# Patient Record
Sex: FEMALE | Race: BLACK OR AFRICAN AMERICAN | HISPANIC OR LATINO
[De-identification: names, ages, dates, MRNs, and addresses within clinical notes are randomized per-mention and may not be internally consistent; named-entity substitution may affect disease eponyms.]

---

## 2017-12-16 RX ORDER — SODIUM CHLORIDE 9 MG/ML
3 INJECTION INTRAMUSCULAR; INTRAVENOUS; SUBCUTANEOUS EVERY 8 HOURS
Qty: 0 | Refills: 0 | Status: DISCONTINUED | OUTPATIENT
Start: 2017-12-18 | End: 2017-12-18

## 2017-12-16 RX ORDER — FAMOTIDINE 10 MG/ML
20 INJECTION INTRAVENOUS ONCE
Qty: 0 | Refills: 0 | Status: COMPLETED | OUTPATIENT
Start: 2017-12-18 | End: 2017-12-18

## 2017-12-18 ENCOUNTER — RESULT REVIEW (OUTPATIENT)
Age: 52
End: 2017-12-18

## 2017-12-18 ENCOUNTER — OUTPATIENT (OUTPATIENT)
Dept: OUTPATIENT SERVICES | Facility: HOSPITAL | Age: 52
LOS: 1 days | Discharge: HOME | End: 2017-12-18
Payer: COMMERCIAL

## 2017-12-18 VITALS
TEMPERATURE: 98 F | RESPIRATION RATE: 14 BRPM | WEIGHT: 141.98 LBS | SYSTOLIC BLOOD PRESSURE: 140 MMHG | HEIGHT: 64 IN | OXYGEN SATURATION: 100 % | DIASTOLIC BLOOD PRESSURE: 75 MMHG | HEART RATE: 73 BPM

## 2017-12-18 VITALS
DIASTOLIC BLOOD PRESSURE: 70 MMHG | HEART RATE: 69 BPM | OXYGEN SATURATION: 100 % | TEMPERATURE: 98 F | SYSTOLIC BLOOD PRESSURE: 131 MMHG | RESPIRATION RATE: 14 BRPM

## 2017-12-18 DIAGNOSIS — Z90.710 ACQUIRED ABSENCE OF BOTH CERVIX AND UTERUS: Chronic | ICD-10-CM

## 2017-12-18 PROCEDURE — 88304 TISSUE EXAM BY PATHOLOGIST: CPT | Mod: 26

## 2017-12-18 RX ORDER — OXYCODONE AND ACETAMINOPHEN 5; 325 MG/1; MG/1
1 TABLET ORAL EVERY 4 HOURS
Qty: 0 | Refills: 0 | Status: DISCONTINUED | OUTPATIENT
Start: 2017-12-18 | End: 2017-12-18

## 2017-12-18 RX ORDER — FENTANYL CITRATE 50 UG/ML
25 INJECTION INTRAVENOUS
Qty: 0 | Refills: 0 | Status: DISCONTINUED | OUTPATIENT
Start: 2017-12-18 | End: 2017-12-18

## 2017-12-18 RX ORDER — SODIUM CHLORIDE 9 MG/ML
1000 INJECTION INTRAMUSCULAR; INTRAVENOUS; SUBCUTANEOUS
Qty: 0 | Refills: 0 | Status: DISCONTINUED | OUTPATIENT
Start: 2017-12-18 | End: 2017-12-18

## 2017-12-18 RX ORDER — ERGOCALCIFEROL 1.25 MG/1
2000 CAPSULE ORAL
Qty: 0 | Refills: 0 | COMMUNITY

## 2017-12-18 RX ORDER — ONDANSETRON 8 MG/1
4 TABLET, FILM COATED ORAL ONCE
Qty: 0 | Refills: 0 | Status: DISCONTINUED | OUTPATIENT
Start: 2017-12-18 | End: 2017-12-18

## 2017-12-18 RX ORDER — MEPERIDINE HYDROCHLORIDE 50 MG/ML
12.5 INJECTION INTRAMUSCULAR; INTRAVENOUS; SUBCUTANEOUS
Qty: 0 | Refills: 0 | Status: DISCONTINUED | OUTPATIENT
Start: 2017-12-18 | End: 2017-12-18

## 2017-12-18 RX ORDER — ACETAMINOPHEN 500 MG
1000 TABLET ORAL ONCE
Qty: 0 | Refills: 0 | Status: DISCONTINUED | OUTPATIENT
Start: 2017-12-18 | End: 2017-12-18

## 2017-12-18 RX ADMIN — FAMOTIDINE 20 MILLIGRAM(S): 10 INJECTION INTRAVENOUS at 07:22

## 2017-12-18 NOTE — ASU DISCHARGE PLAN (ADULT/PEDIATRIC). - MEDICATION SUMMARY - MEDICATIONS TO CHANGE
I will SWITCH the dose or number of times a day I take the medications listed below when I get home from the hospital:    Vitamin D2  -- 2000 unit(s) by mouth once a day

## 2017-12-28 DIAGNOSIS — D17.1 BENIGN LIPOMATOUS NEOPLASM OF SKIN AND SUBCUTANEOUS TISSUE OF TRUNK: ICD-10-CM

## 2018-06-04 ENCOUNTER — APPOINTMENT (OUTPATIENT)
Dept: OBGYN | Facility: CLINIC | Age: 53
End: 2018-06-04
Payer: COMMERCIAL

## 2018-06-04 ENCOUNTER — TRANSCRIPTION ENCOUNTER (OUTPATIENT)
Age: 53
End: 2018-06-04

## 2018-06-04 VITALS
SYSTOLIC BLOOD PRESSURE: 120 MMHG | HEIGHT: 64 IN | WEIGHT: 147 LBS | HEART RATE: 80 BPM | DIASTOLIC BLOOD PRESSURE: 67 MMHG | BODY MASS INDEX: 25.1 KG/M2

## 2018-06-04 DIAGNOSIS — Z78.9 OTHER SPECIFIED HEALTH STATUS: ICD-10-CM

## 2018-06-04 DIAGNOSIS — Z00.00 ENCOUNTER FOR GENERAL ADULT MEDICAL EXAMINATION W/OUT ABNORMAL FINDINGS: ICD-10-CM

## 2018-06-04 PROCEDURE — 99396 PREV VISIT EST AGE 40-64: CPT

## 2018-06-06 LAB
C TRACH RRNA SPEC QL NAA+PROBE: NOT DETECTED
HPV HIGH+LOW RISK DNA PNL CVX: NOT DETECTED
N GONORRHOEA RRNA SPEC QL NAA+PROBE: NOT DETECTED
SOURCE TP AMPLIFICATION: NORMAL

## 2018-06-11 LAB — CYTOLOGY CVX/VAG DOC THIN PREP: NORMAL

## 2020-06-09 ENCOUNTER — APPOINTMENT (OUTPATIENT)
Dept: OBGYN | Facility: CLINIC | Age: 55
End: 2020-06-09
Payer: COMMERCIAL

## 2020-06-09 VITALS
HEIGHT: 64 IN | SYSTOLIC BLOOD PRESSURE: 124 MMHG | HEART RATE: 89 BPM | DIASTOLIC BLOOD PRESSURE: 81 MMHG | WEIGHT: 149.44 LBS | BODY MASS INDEX: 25.51 KG/M2

## 2020-06-09 PROCEDURE — 99214 OFFICE O/P EST MOD 30 MIN: CPT

## 2020-06-09 NOTE — PHYSICAL EXAM
[Awake] : awake [Alert] : alert [Acute Distress] : no acute distress [Mass] : no breast mass [Nipple Discharge] : no nipple discharge [Axillary LAD] : no axillary lymphadenopathy [Soft] : soft [Tender] : non tender [Oriented x3] : oriented to person, place, and time [Normal] : cervix [Cystocele] : no cystocele [Atrophy] : atrophy [Dry Mucosa] : dry mucosa [Discharge] : a  ~M vaginal discharge was present [No Bleeding] : there was no active vaginal bleeding [Absent] : absent [Uterine Adnexae] : were not tender and not enlarged

## 2020-06-12 LAB — CYTOLOGY CVX/VAG DOC THIN PREP: NORMAL

## 2021-07-18 ENCOUNTER — TRANSCRIPTION ENCOUNTER (OUTPATIENT)
Age: 56
End: 2021-07-18

## 2022-01-12 ENCOUNTER — TRANSCRIPTION ENCOUNTER (OUTPATIENT)
Age: 57
End: 2022-01-12

## 2022-03-16 ENCOUNTER — APPOINTMENT (OUTPATIENT)
Dept: OBGYN | Facility: CLINIC | Age: 57
End: 2022-03-16
Payer: COMMERCIAL

## 2022-03-16 VITALS
DIASTOLIC BLOOD PRESSURE: 60 MMHG | HEIGHT: 64 IN | SYSTOLIC BLOOD PRESSURE: 110 MMHG | WEIGHT: 146 LBS | BODY MASS INDEX: 24.92 KG/M2

## 2022-03-16 DIAGNOSIS — N90.5 ATROPHY OF VULVA: ICD-10-CM

## 2022-03-16 DIAGNOSIS — N95.2 POSTMENOPAUSAL ATROPHIC VAGINITIS: ICD-10-CM

## 2022-03-16 DIAGNOSIS — Z78.0 ASYMPTOMATIC MENOPAUSAL STATE: ICD-10-CM

## 2022-03-16 PROCEDURE — 99396 PREV VISIT EST AGE 40-64: CPT

## 2022-03-16 NOTE — COUNSELING
[Body Image] : body image [Nutrition/ Exercise/ Weight Management] : nutrition, exercise, weight management [Vitamins/Supplements] : vitamins/supplements [Sunscreen] : sunscreen [Smoking Cessation] : smoking cessation [Drugs/Alcohol] : drugs, alcohol [Breast Self Exam] : breast self exam [Bladder Hygiene] : bladder hygiene

## 2022-03-16 NOTE — PHYSICAL EXAM
[Chaperone Present] : A chaperone was present in the examining room during all aspects of the physical examination [FreeTextEntry1] : meena [Appropriately responsive] : appropriately responsive [Alert] : alert [No Acute Distress] : no acute distress [No Lymphadenopathy] : no lymphadenopathy [Regular Rate Rhythm] : regular rate rhythm [No Murmurs] : no murmurs [Clear to Auscultation B/L] : clear to auscultation bilaterally [Soft] : soft [Non-tender] : non-tender [Non-distended] : non-distended [No HSM] : No HSM [No Lesions] : no lesions [No Mass] : no mass [Oriented x3] : oriented x3 [Examination Of The Breasts] : a normal appearance [No Masses] : no breast masses were palpable [Vulvar Atrophy] : vulvar atrophy [Labia Majora] : normal [Labia Minora] : normal [Atrophy] : atrophy [Normal] : normal [Absent] : absent [Uterine Adnexae] : normal

## 2022-03-16 NOTE — HISTORY OF PRESENT ILLNESS
[Y] : Positive pregnancy history [Menarche Age: ____] : age at menarche was [unfilled] [PGHxTotal] : 5 [[facility]] : 5 [PGHxPremature] : 0 [PGHxAbortions] : 0 [[facility]Living] : 4 [PGHxABInduced] : 0 [PGHxABSpont] : 0 [PGHxEctopic] : 0 [PGHxMultBirths] : 0 [FreeTextEntry1] : 1 stillbirth

## 2022-03-23 LAB — CYTOLOGY CVX/VAG DOC THIN PREP: NORMAL

## 2022-05-29 ENCOUNTER — EMERGENCY (EMERGENCY)
Facility: HOSPITAL | Age: 57
LOS: 0 days | Discharge: HOME | End: 2022-05-29
Attending: EMERGENCY MEDICINE
Payer: COMMERCIAL

## 2022-05-29 VITALS
HEART RATE: 73 BPM | DIASTOLIC BLOOD PRESSURE: 73 MMHG | RESPIRATION RATE: 18 BRPM | TEMPERATURE: 98 F | SYSTOLIC BLOOD PRESSURE: 127 MMHG | OXYGEN SATURATION: 97 %

## 2022-05-29 VITALS — HEIGHT: 64 IN | WEIGHT: 145.95 LBS

## 2022-05-29 DIAGNOSIS — M79.644 PAIN IN RIGHT FINGER(S): ICD-10-CM

## 2022-05-29 DIAGNOSIS — Z90.711 ACQUIRED ABSENCE OF UTERUS WITH REMAINING CERVICAL STUMP: ICD-10-CM

## 2022-05-29 DIAGNOSIS — L03.011 CELLULITIS OF RIGHT FINGER: ICD-10-CM

## 2022-05-29 DIAGNOSIS — Z90.710 ACQUIRED ABSENCE OF BOTH CERVIX AND UTERUS: Chronic | ICD-10-CM

## 2022-05-29 PROCEDURE — 99284 EMERGENCY DEPT VISIT MOD MDM: CPT | Mod: 25

## 2022-05-29 PROCEDURE — 10060 I&D ABSCESS SIMPLE/SINGLE: CPT

## 2022-05-29 PROCEDURE — 99282 EMERGENCY DEPT VISIT SF MDM: CPT

## 2022-05-29 NOTE — ED STATDOCS - OBJECTIVE STATEMENT
56 y/o female with a PMHx of abd hysterectomy presents to the ED c/o finger pain. States she had thumb pain 05/25 then developed swelling and went to MD 05/ 27 and was rx Amoxicillin. Reports she went to Urgent Care and was sent to ED.  No fever or chills. Notes her thumb looks worse than how it did when she started to have pain.

## 2022-05-29 NOTE — ED ADULT NURSE NOTE - OBJECTIVE STATEMENT
right thumb swelling and painful and warm to touch. on ABX since friday. Sent to ED for further treatment.

## 2022-05-29 NOTE — ED STATDOCS - MUSCULOSKELETAL, MLM
range of motion is not limited and there is no muscle tenderness. Swelling mostly to distal surface of R thumb, slight swelling at base of nail

## 2022-05-29 NOTE — ED STATDOCS - PATIENT PORTAL LINK FT
You can access the FollowMyHealth Patient Portal offered by [facility] by registering at the following website: [email or web address]. By joining [facility]’s FollowMyHealth portal, you will also be able to view your health information using other applications (apps) compatible with our system.

## 2022-05-29 NOTE — ED STATDOCS - PROGRESS NOTE DETAILS
signed Claritza Mcqueen PA-C Pt seen initially in intake by Dr Ochoa.   57F with right thumb infection, pain and swelling x 6 days. Pt denies nail biting or recent manicure. Pt saw PMD on 5/27 and was given augmentin but symptoms continued to worsen. No fever. Pt with felon or possible paronychia of thumb. Seen in ED by ortho resident, I&D performed. Pt may DC home, f/u on 6/1 with Dr Hernández. COntinue abx. Pt feeling well, pt and family agree with DC and plan of care.

## 2022-05-29 NOTE — ED STATDOCS - NSFOLLOWUPINSTRUCTIONS_ED_ALL_ED_FT
FOLLOW UP WITH DR ROCHA ON TUESDAY IN HIS OFFICE. CALL THE OFFICE TO MAKE AN APPOINTMENT. CONTINUE TAKING YOUR ANTIBIOTICS. RETURN TO ER FOR ANY WORSENING SYMPTOMS OR NEW CONCERNS.     Fingertip Infection    A normal fingertip next to a fingertip that is infected and has pus around the nail.   There are two main types of fingertip infections:  •Long-term (chronic) or acute paronychia. This is an infection that happens around your nail. This type of infection can start in one nail or occur gradually over time and affect more than one nail. The fingernails that are infected may become thick and deformed. This condition can also happen suddenly (be acute).      •Felon. This is a bacterial infection in the tip of your finger (pad). A felon infection can cause a painful collection of pus (an abscess) to form inside your fingertip. If the infection is not treated, the infection can spread as deep as the tendon or bone.        What are the causes?    Paronychia infection can be caused by:  •Bacteria.      •Funguses.      •A mix of both bacteria and funguses.      A felon infection is usually caused by the bacteria that are normally found on your skin. An infection can develop if the bacteria spread through your skin to the pad of tissue inside your fingertip.      What increases the risk?    You are more likely to develop a fingertip infection if:  •You have diabetes.      •You have a weak body's defense system (immune system).      •You work with your hands.      •Your hands are exposed to moisture, chemicals, or irritants for long periods of time.      •You have poor circulation.      •You bite, chew, or pick your fingernails.        What are the signs or symptoms?    Symptoms of paronychia infection may affect one or more fingernails and may include:  •Pain, swelling, and redness around the nail.      •Pus-filled pockets at the base or side of the fingernail (cuticle).      •Thick fingernails that separate from the nail bed.      •Pus that drains from the nail bed.      Symptoms of a felon usually affect just one fingertip pad and include:  •Severe, throbbing pain.      •Redness.      •Swelling.      •Warmth.      •Tenderness when the affected fingertip is touched.        How is this diagnosed?    This condition is diagnosed based on:  •Your medical history.      •A physical exam.      •Testing. If there is pus draining from the infection, it may be swabbed and sent to the lab for a culture.      •An X-ray. This may be done to see if the infection has spread to the bone.        How is this treated?    Treatment for a fingertip infection may include:  •Warm water or salt water soaks several times per day.      •Antibiotic medicine. This may be an ointment or pills.      •Steroid ointment.      •Antifungal pills.      •Drainage of pus pockets. This is done by making an incision to open the fingertip to drain pus.      •Wearing gloves to protect your nails.        Follow these instructions at home:    Medicines     •Take or apply over-the-counter and prescription medicines only as told by your health care provider.      •If you were prescribed an antibiotic medicine, take or apply it as told by your health care provider. Do not stop using the antibiotic even if you start to feel better.      Wound care   •Follow instructions from your health care provider about how to take care of your wound. Make sure you:  •Wash your hands with soap and water before and after you change your bandage (dressing). If soap and water are not available, use hand .      •Change your dressing as told by your health care provider.      •Leave stitches (sutures), skin glue, or adhesive strips in place. These skin closures may need to stay in place for 2 weeks or longer. If adhesive strip edges start to loosen and curl up, you may trim the loose edges. Do not remove adhesive strips completely unless your health care provider tells you to do that.      •Clean the infected area each day with warm water or salt water, or as told by your health care provider.  •Gently wash the infected area with mild soap and water.      •Rinse the infected area with water to remove all soap.      •Pat the infected area dry with a clean towel. Do not rub it.      •To make a salt and water mixture, completely dissolve ½–1 tsp (3–6 g) of salt in 1 cup (237 mL) of warm water.      •Check the infected area every day for more signs of infection. Watch for:  •More redness, swelling, or pain.      •More fluid or blood.      •Warmth.      •A bad smell.        Bathing     •Keep the dressing dry until your health care provider says it can be removed.      •Ask your health care provider if you may take baths, swim, shower, or use a hot tub. To help prevent spread of the infection, you may only be allowed to take sponge baths. This is rare.      • Do not let your bandage get wet. Cover it with a watertight covering when you take a bath or shower.      General instructions     •Raise (elevate) the infected area above the level of your heart while you are sitting or lying down or as told by your health care provider. This will help reduce inflammation.      • Do not scratch or pick at the infected area.      •Wear gloves as told by your health care provider.      •Keep all follow-up visits as told by your health care provider. This is important.        How is this prevented?    •Wear gloves when you work with your hands.      •Wash your hands often with antibacterial soap.      •Avoid letting your hands stay wet or irritated for long periods of time.      • Do not bite your fingernails.      • Do not suck on your fingers.      • Do not pull on your cuticles.      •Use clean scissors or nail clippers to trim your nails. Do not cut your fingernails very short.        Contact a health care provider if:    •Your pain medicine is not helping.      •You have more redness, swelling, or pain at your fingertip.      •You continue to have fluid, blood, or pus coming from your fingertip.      •Your infection area feels warm to the touch.      •You continue to notice a bad smell coming from your fingertip or your dressing.        Get help right away if:    •The area of redness is spreading, or you notice a red streak going away from your fingertip.      •You have a fever.        Summary    •Paronychia is an infection that happens around your nail. Paronychia infection can be caused by bacteria, funguses, or a mix of both.      •A felon infection is usually caused by the bacteria that are normally found on your skin. An infection can develop if the bacteria spread through your skin to the pad of tissue inside your fingertip.      •Follow instructions from your health care provider about how to take care of the infection.      •Take or apply over-the-counter and prescription medicines only as told by your health care provider.      •Contact a health care provider if you have more drainage, redness, swelling, or pain at your fingertip.      This information is not intended to replace advice given to you by your health care provider. Make sure you discuss any questions you have with your health care provider.      Document Revised: 03/29/2022 Document Reviewed: 03/29/2022    Elsevier Patient Education © 2022 [employer]

## 2022-05-29 NOTE — CONSULT NOTE ADULT - SUBJECTIVE AND OBJECTIVE BOX
57y Female presents with right thumb swelling distal to the DIP joint, of the fingertip pulp. Patient reports that thumb pain stated 5/25, she went to her PCP on 5/27 where she was given Augmentin. After failure to improve on the oral abx, patient went to urgent care and was referred to [facility] ED. Denies numbness/tingling in the affected digit. Denies trauma, bite, picking/biting, recent trip to the nail salon or other orthopedic injuries at this time. Patient is RIGHT hand dominant. Patient ambulates without assistance at baseline. Patient denies fever / chills. No fusiform swelling, no flexed posturing, no tenderness over the flexor tendon sheath, no pain w/ passive extension of the digit. Patient has no tenderness in the palm or the base of the affected phalanx.    PAST MEDICAL & SURGICAL HISTORY:  No pertinent past medical history      H/O abdominal hysterectomy        Home Medications:    Allergies    No Known Allergies    Intolerances                        Vital Signs Last 24 Hrs  T(C): 36.8 (29 May 2022 15:44), Max: 36.8 (29 May 2022 15:44)  T(F): 98.2 (29 May 2022 15:44), Max: 98.2 (29 May 2022 15:44)  HR: 73 (29 May 2022 15:44) (73 - 73)  BP: 127/73 (29 May 2022 15:44) (127/73 - 127/73)  BP(mean): 90 (29 May 2022 15:44) (90 - 90)  RR: 18 (29 May 2022 15:44) (18 - 18)  SpO2: 97% (29 May 2022 15:44) (97% - 97%)    PHYSICAL EXAM  General: NAD, Awake and Alert    RUE:   Skin intact, no open wound  Mild erythema of the fingertip pulp  +Swelling of the Pulp distal to the DIP  No gross deformity  NTTP over the bony prominences of the elbow/forearm/wrist  No tenderness over the palmar space  No streaking, no fusiform swelling, no pain with passive extension, no flexed posturing, no tenderness to palpation over the tendon sheath   painless passive/active ROM of the shoulder/elbow/wrist/hand  Radial and ulnar sensory nerves to thumb intact  C5-T1 SILT  motor grossly intact throughout axillary/musculocutaenous/radial/median/ulnar nerves  + radial pulse  warm/well perfused    Procedure:  Under aseptic conditions, a digital block was administered to the right thumb w/ 7cc of 1% lidocaine. The hand was prepped and draped in standard sterile fashion. A radial sided incision with a #11 blade was made in a mid-radial aspect of the lateral fingertip pulp. A mosquito was used to spread breaking septations and expressing a minimal amount of turbid/purulent fluid. No gross pus was expressible. Wound copiously irrigated with betadine, followed by normal saline. Xeroform was laid over the finger in a stirrup shape to cover the incision. A dry dressing of 4x4 gauze was applied, loosely wrapped with cling and coban. The patient tolerated the procedure well and there we no complications. The patient's post-procedure neurovascular exam was unchanged.       Assessment/Plan:  57y Female with R thumb felon s/p I&D in the ED, unlikely paronychia    -No concerning symptoms for flexor tenosynovitis or deep space infection of the hand  -I&D bedside in the ED performed w/ local anesthesia  -Antibiotics per the ED, patient previously taking Augmentin per  at bedside   -Pain control as needed per ED  -DVT ppx per primary  -NWB right hand in well padded dressing, loosely wrapped with cling/coban  -Please keep dressing c/d/i until follow up appointment Tuesday  -Patient instructed to return to the ED if clinical condition worsens for further evaluation and management   -Follow up w/ Dr. Hernández Tuesday, 5/31, please call the office to make an appointment   -Discussed with attending Dr. Hernández, who is aware  -Will advise if plan changes

## 2022-05-29 NOTE — ED STATDOCS - CARE PROVIDER_API CALL
Liban Hernández)  Orthopaedic Surgery; Surgery of the Hand  [address] [place] [address]  Phone: [phone]  Fax: [phone]  Follow Up Time:

## 2022-05-29 NOTE — ED STATDOCS - NS_ATTENDINGSCRIBE_ED_ALL_ED
Tessy Corbin)  Internal Medicine; Pulmonary Disease  [address] [facility] [facility] [address]  [place] [address]  Phone: [phone]  Fax: [phone]  Follow Up Time:
I personally performed the service described in the documentation recorded by the scribe in my presence, and it accurately and completely records my words and actions.

## 2022-05-29 NOTE — ED STATDOCS - ATTENDING APP SHARED VISIT CONTRIBUTION OF CARE
I,Amor Ochoa MD,  performed the initial face to face bedside interview with this patient regarding history of present illness, review of symptoms and relevant past medical, social and family history.  I completed an independent physical examination.  I was the initial provider who evaluated this patient. I have signed out the follow up of any pending tests (i.e. labs, radiological studies) to the ACP.  I have communicated the patient’s plan of care and disposition with the ACP.  The history, relevant review of systems, past medical and surgical history, medical decision making, and physical examination was documented by the scribe in my presence and I attest to the accuracy of the documentation.

## 2022-05-29 NOTE — ED STATDOCS - NS ED ATTENDING STATEMENT MOD
This was a shared visit with the CARLI. I reviewed and verified the documentation and independently performed the documented:

## 2023-10-10 ENCOUNTER — TRANSCRIPTION ENCOUNTER (OUTPATIENT)
Age: 58
End: 2023-10-10

## 2023-10-11 ENCOUNTER — OUTPATIENT (OUTPATIENT)
Dept: OUTPATIENT SERVICES | Facility: HOSPITAL | Age: 58
LOS: 1 days | End: 2023-10-11
Payer: COMMERCIAL

## 2023-10-11 ENCOUNTER — TRANSCRIPTION ENCOUNTER (OUTPATIENT)
Age: 58
End: 2023-10-11

## 2023-10-11 VITALS
RESPIRATION RATE: 17 BRPM | HEIGHT: 63.5 IN | SYSTOLIC BLOOD PRESSURE: 151 MMHG | OXYGEN SATURATION: 100 % | HEART RATE: 67 BPM | DIASTOLIC BLOOD PRESSURE: 82 MMHG | WEIGHT: 131.4 LBS | TEMPERATURE: 98 F

## 2023-10-11 VITALS
HEART RATE: 64 BPM | RESPIRATION RATE: 17 BRPM | DIASTOLIC BLOOD PRESSURE: 63 MMHG | SYSTOLIC BLOOD PRESSURE: 108 MMHG | OXYGEN SATURATION: 100 %

## 2023-10-11 DIAGNOSIS — Z90.89 ACQUIRED ABSENCE OF OTHER ORGANS: Chronic | ICD-10-CM

## 2023-10-11 DIAGNOSIS — H35.341 MACULAR CYST, HOLE, OR PSEUDOHOLE, RIGHT EYE: ICD-10-CM

## 2023-10-11 DIAGNOSIS — Z90.710 ACQUIRED ABSENCE OF BOTH CERVIX AND UTERUS: Chronic | ICD-10-CM

## 2023-10-11 PROCEDURE — 67041 VIT FOR MACULAR PUCKER: CPT | Mod: AS,RT

## 2023-10-11 PROCEDURE — 67042 VIT FOR MACULAR HOLE: CPT | Mod: RT

## 2023-10-11 PROCEDURE — C1889: CPT

## 2023-10-11 DEVICE — LASER PROBE 25G CONSTELLATION
Type: IMPLANTABLE DEVICE | Site: RIGHT | Status: NON-FUNCTIONAL
Removed: 2023-10-11

## 2023-10-11 DEVICE — GS SF6 SULFER HEXAFLUORIDE 3 L 20GM
Type: IMPLANTABLE DEVICE | Site: RIGHT | Status: NON-FUNCTIONAL
Removed: 2023-10-11

## 2023-10-11 NOTE — ASU DISCHARGE PLAN (ADULT/PEDIATRIC) - CARE PROVIDER_API CALL
Bradly Serrano  [address]  [place] [address]  Phone: [phone]  Fax: (   )    -  Scheduled Appointment: 10/12/2023 09:45 AM   Bradly Serrano  [address]  [place] [address]  Phone: [phone]  Fax: (   )    -  Scheduled Appointment: 10/12/2023 09:45 AM   Bradly Serrano  [address]  [place] [address]  Phone: [phone]  Fax: (   )    -  Scheduled Appointment: 10/12/2023 09:45 AM

## 2023-10-11 NOTE — ASU PATIENT PROFILE, ADULT - PAIN LOCATION, PROFILE
neck Peng Advancement Flap Text: The defect edges were debeveled with a #15 scalpel blade.  Given the location of the defect, shape of the defect and the proximity to free margins a Peng advancement flap was deemed most appropriate.  Using a sterile surgical marker, an appropriate advancement flap was drawn incorporating the defect and placing the expected incisions within the relaxed skin tension lines where possible. The area thus outlined was incised deep to adipose tissue with a #15 scalpel blade.  The skin margins were undermined to an appropriate distance in all directions utilizing iris scissors.

## 2023-10-11 NOTE — ASU DISCHARGE PLAN (ADULT/PEDIATRIC) - ASU DC SPECIAL INSTRUCTIONSFT
Maintain face down position  Follow up in the office tomorrow  Hold Aleve for tonight,   May discuss further with surgeon when seen in the office tomorrow

## 2023-10-11 NOTE — ASU DISCHARGE PLAN (ADULT/PEDIATRIC) - WEAR THE DARK SUNGLASSES WE HAVE PROVIDED WHILE OUTDOORS OR IF YOU ARE LIGHT-SENSITIVE
Problem: MOBILITY - ADULT  Goal: Maintain or return to baseline ADL function  Description: INTERVENTIONS:  -  Assess patient's ability to carry out ADLs; assess patient's baseline for ADL function and identify physical deficits which impact ability to perform ADLs (bathing, care of mouth/teeth, toileting, grooming, dressing, etc )  - Assess/evaluate cause of self-care deficits   - Assess range of motion  - Assess patient's mobility; develop plan if impaired  - Assess patient's need for assistive devices and provide as appropriate  - Encourage maximum independence but intervene and supervise when necessary  - Involve family in performance of ADLs  - Assess for home care needs following discharge   - Consider OT consult to assist with ADL evaluation and planning for discharge  - Provide patient education as appropriate  Outcome: Progressing  Goal: Maintains/Returns to pre admission functional level  Description: INTERVENTIONS:  - Perform BMAT or MOVE assessment daily    - Set and communicate daily mobility goal to care team and patient/family/caregiver  - Collaborate with rehabilitation services on mobility goals if consulted  - Perform Range of Motion 4 times a day  - Reposition patient every 2 hours    - Dangle patient 2 times a day  - Stand patient 2 times a day  - Ambulate patient 2 times a day  - Out of bed to chair 2 times a day   - Out of bed for meals 2 times a day  - Out of bed for toileting  - Record patient progress and toleration of activity level   Outcome: Progressing     Problem: Prexisting or High Potential for Compromised Skin Integrity  Goal: Skin integrity is maintained or improved  Description: INTERVENTIONS:  - Identify patients at risk for skin breakdown  - Assess and monitor skin integrity  - Assess and monitor nutrition and hydration status  - Monitor labs   - Assess for incontinence   - Turn and reposition patient  - Assist with mobility/ambulation  - Relieve pressure over bony prominences  - Avoid friction and shearing  - Provide appropriate hygiene as needed including keeping skin clean and dry  - Evaluate need for skin moisturizer/barrier cream  - Collaborate with interdisciplinary team   - Patient/family teaching  - Consider wound care consult   Outcome: Progressing     Problem: Potential for Falls  Goal: Patient will remain free of falls  Description: INTERVENTIONS:  - Educate patient/family on patient safety including physical limitations  - Instruct patient to call for assistance with activity   - Consult OT/PT to assist with strengthening/mobility   - Keep Call bell within reach  - Keep bed low and locked with side rails adjusted as appropriate  - Keep care items and personal belongings within reach  - Initiate and maintain comfort rounds  - Make Fall Risk Sign visible to staff    - Apply yellow socks and bracelet for high fall risk patients  - Consider moving patient to room near nurses station  Outcome: Progressing     Problem: PAIN - ADULT  Goal: Verbalizes/displays adequate comfort level or baseline comfort level  Description: Interventions:  - Encourage patient to monitor pain and request assistance  - Assess pain using appropriate pain scale  - Administer analgesics based on type and severity of pain and evaluate response  - Implement non-pharmacological measures as appropriate and evaluate response  - Consider cultural and social influences on pain and pain management  - Notify physician/advanced practitioner if interventions unsuccessful or patient reports new pain  Outcome: Progressing     Problem: INFECTION - ADULT  Goal: Absence or prevention of progression during hospitalization  Description: INTERVENTIONS:  - Assess and monitor for signs and symptoms of infection  - Monitor lab/diagnostic results  - Monitor all insertion sites, i e  indwelling lines, tubes, and drains  - Monitor endotracheal if appropriate and nasal secretions for changes in amount and color  - Kirkman appropriate cooling/warming therapies per order  - Administer medications as ordered  - Instruct and encourage patient and family to use good hand hygiene technique  - Identify and instruct in appropriate isolation precautions for identified infection/condition  Outcome: Progressing  Goal: Absence of fever/infection during neutropenic period  Description: INTERVENTIONS:  - Monitor WBC    Outcome: Progressing     Problem: SAFETY ADULT  Goal: Maintain or return to baseline ADL function  Description: INTERVENTIONS:  -  Assess patient's ability to carry out ADLs; assess patient's baseline for ADL function and identify physical deficits which impact ability to perform ADLs (bathing, care of mouth/teeth, toileting, grooming, dressing, etc )  - Assess/evaluate cause of self-care deficits   - Assess range of motion  - Assess patient's mobility; develop plan if impaired  - Assess patient's need for assistive devices and provide as appropriate  - Encourage maximum independence but intervene and supervise when necessary  - Involve family in performance of ADLs  - Assess for home care needs following discharge   - Consider OT consult to assist with ADL evaluation and planning for discharge  - Provide patient education as appropriate  Outcome: Progressing  Goal: Maintains/Returns to pre admission functional level  Description: INTERVENTIONS:  - Perform BMAT or MOVE assessment daily    - Set and communicate daily mobility goal to care team and patient/family/caregiver  - Collaborate with rehabilitation services on mobility goals if consulted  - Perform Range of Motion 4 times a day  - Reposition patient every 2 hours    - Dangle patient 2 times a day  - Stand patient 2 times a day  - Ambulate patient 2 times a day  - Out of bed to chair 2 times a day   - Out of bed for meals 2 times a day  - Out of bed for toileting  - Record patient progress and toleration of activity level   Outcome: Progressing  Goal: Patient will remain free of falls  Description: INTERVENTIONS:  - Educate patient/family on patient safety including physical limitations  - Instruct patient to call for assistance with activity   - Consult OT/PT to assist with strengthening/mobility   - Keep Call bell within reach  - Keep bed low and locked with side rails adjusted as appropriate  - Keep care items and personal belongings within reach  - Initiate and maintain comfort rounds  - Make Fall Risk Sign visible to staff  - Apply yellow socks and bracelet for high fall risk patients  - Consider moving patient to room near nurses station  Outcome: Progressing     Problem: DISCHARGE PLANNING  Goal: Discharge to home or other facility with appropriate resources  Description: INTERVENTIONS:  - Identify barriers to discharge w/patient and caregiver  - Arrange for needed discharge resources and transportation as appropriate  - Identify discharge learning needs (meds, wound care, etc )  - Arrange for interpretive services to assist at discharge as needed  - Refer to Case Management Department for coordinating discharge planning if the patient needs post-hospital services based on physician/advanced practitioner order or complex needs related to functional status, cognitive ability, or social support system  Outcome: Progressing     Problem: Knowledge Deficit  Goal: Patient/family/caregiver demonstrates understanding of disease process, treatment plan, medications, and discharge instructions  Description: Complete learning assessment and assess knowledge base  Interventions:  - Provide teaching at level of understanding  - Provide teaching via preferred learning methods  Outcome: Progressing     Problem: Nutrition/Hydration-ADULT  Goal: Nutrient/Hydration intake appropriate for improving, restoring or maintaining nutritional needs  Description: Monitor and assess patient's nutrition/hydration status for malnutrition   Collaborate with interdisciplinary team and initiate plan and interventions as ordered  Monitor patient's weight and dietary intake as ordered or per policy  Utilize nutrition screening tool and intervene as necessary  Determine patient's food preferences and provide high-protein, high-caloric foods as appropriate       INTERVENTIONS:  - Monitor oral intake, urinary output, labs, and treatment plans  - Assess nutrition and hydration status and recommend course of action  - Evaluate amount of meals eaten  - Assist patient with eating if necessary   - Allow adequate time for meals  - Recommend/ encourage appropriate diets, oral nutritional supplements, and vitamin/mineral supplements  - Order, calculate, and assess calorie counts as needed  - Recommend, monitor, and adjust tube feedings and TPN/PPN based on assessed needs  - Assess need for intravenous fluids  - Provide specific nutrition/hydration education as appropriate  - Include patient/family/caregiver in decisions related to nutrition  Outcome: Progressing     Problem: RESPIRATORY - ADULT  Goal: Achieves optimal ventilation and oxygenation  Description: INTERVENTIONS:  - Assess for changes in respiratory status  - Assess for changes in mentation and behavior  - Position to facilitate oxygenation and minimize respiratory effort  - Oxygen administered by appropriate delivery if ordered  - Initiate smoking cessation education as indicated  - Encourage broncho-pulmonary hygiene including cough, deep breathe, Incentive Spirometry  - Assess the need for suctioning and aspirate as needed  - Assess and instruct to report SOB or any respiratory difficulty  - Respiratory Therapy support as indicated  Outcome: Progressing     Problem: COPING  Goal: Pt/Family able to verbalize concerns and demonstrate effective coping strategies  Description: INTERVENTIONS:  - Assist patient/family to identify coping skills, available support systems and cultural and spiritual values  - Provide emotional support, including active listening and acknowledgement of concerns of patient and caregivers  - Reduce environmental stimuli, as able  - Provide patient education  - Assess for spiritual pain/suffering and initiate spiritual care, including notification of Pastoral Care or kathy based community as needed  - Assess effectiveness of coping strategies  Outcome: Progressing  Goal: Will report anxiety at manageable levels  Description: INTERVENTIONS:  - Administer medication as ordered  - Teach and encourage coping skills  - Provide emotional support  - Assess patient/family for anxiety and ability to cope  Outcome: Progressing     Problem: BEHAVIOR  Goal: Pt/Family maintain appropriate behavior and adhere to behavioral management agreement, if implemented  Description: INTERVENTIONS:  - Assess the family dynamic   - Encourage verbalization of thoughts and concerns in a socially appropriate manner  - Assess patient/family's coping skills and non-compliant behavior (including use of illegal substances)  - Utilize positive, consistent limit setting strategies supporting safety of patient, staff and others  - Initiate consult with Case Management, Spiritual Care or other ancillary services as appropriate  - If a patient's/visitor's behavior jeopardizes the safety of the patient, staff, or others, refer to organization procedure     - Notify Security of behavior or suspected illegal substances which indicate the need for search of the patient and/or belongings  - Encourage participation in the decision making process about a behavioral management agreement; implement if patient meets criteria  Outcome: Progressing Statement Selected

## 2023-10-11 NOTE — ASU DISCHARGE PLAN (ADULT/PEDIATRIC) - NS MD DC FALL RISK RISK
For information on Fall & Injury Prevention, visit: [email or web address]/[email or web address]-[email or web address]-[email or web address]-mobility OR  [email or web address]://www.[facility]/news/fall-prevention-tips-[email or web address]-injury OR  https://www.cdc.gov/steadi/patient.html For information on Fall & Injury Prevention, visit: [email or web address]://www.[email or web address]/[email or web address]-[email or web address]-health-[email or web address]-mobility OR  [email or web address]://www.[email or web address]/[email or web address]-prevention-tips-[email or web address]-injury OR  https://www.cdc.gov/steadi/patient.html For information on Fall & Injury Prevention, visit: [email or web address]://www.[email or web address]/news/fall-[email or web address]-[email or web address]-[email or web address]-mobility OR  https://[email or web address].[email or web address]/news/fall-[email or web address]-tips-to-avoid-injury OR  https://www.cdc.gov/steadi/patient.html

## 2023-10-11 NOTE — ASU DISCHARGE PLAN (ADULT/PEDIATRIC) - PROVIDER TOKENS
FREE:[LAST:[Zach],FIRST:[Bradly],PHONE:[phone]:[(   )    -],ADDRESS:[[address] [place] [address]],SCHEDULEDAPPT:[10/12/2023],SCHEDULEDAPPTTIME:[09:45 AM]] FREE:[LAST:[Zach],FIRST:[Bradly],PHONE:[phone]:[(   )    -],ADDRESS:[[address] [place] [address]],SCHEDULEDAPPT:[10/12/2023],SCHEDULEDAPPTTIME:[09:45 AM]] FREE:[LAST:[Zach],FIRST:[Bradly],PHONE:[phone]:[(   )    -],ADDRESS:[[address] [place] [address]],SCHEDULEDAPPT:[10/12/2023],SCHEDULEDAPPTTIME:[09:45 AM]]

## 2023-10-11 NOTE — ASU PATIENT PROFILE, ADULT - FALL HARM RISK - HARM RISK INTERVENTIONS

## 2024-01-24 PROBLEM — E78.5 HYPERLIPIDEMIA, UNSPECIFIED: Chronic | Status: ACTIVE | Noted: 2023-10-11

## 2024-01-29 ENCOUNTER — APPOINTMENT (OUTPATIENT)
Dept: OBGYN | Facility: CLINIC | Age: 59
End: 2024-01-29
Payer: COMMERCIAL

## 2024-01-29 VITALS
DIASTOLIC BLOOD PRESSURE: 60 MMHG | SYSTOLIC BLOOD PRESSURE: 112 MMHG | HEIGHT: 64 IN | WEIGHT: 133.56 LBS | BODY MASS INDEX: 22.8 KG/M2

## 2024-01-29 DIAGNOSIS — Z01.411 ENCOUNTER FOR GYNECOLOGICAL EXAMINATION (GENERAL) (ROUTINE) WITH ABNORMAL FINDINGS: ICD-10-CM

## 2024-01-29 DIAGNOSIS — Z12.39 ENCOUNTER FOR OTHER SCREENING FOR MALIGNANT NEOPLASM OF BREAST: ICD-10-CM

## 2024-01-29 PROCEDURE — 99396 PREV VISIT EST AGE 40-64: CPT

## 2024-01-29 NOTE — PLAN
[FreeTextEntry1] : Encounter for GYN exam   - PAP  obtained  - Mammogram ordered   F/U in 1 year or PRN  All questions and concerns addressed during encounter. Pt. agreed to plan of care.

## 2024-01-29 NOTE — HISTORY OF PRESENT ILLNESS
[Y] : Patient is sexually active [Menarche Age: ____] : age at menarche was [unfilled] [Patient reported mammogram was normal] : Patient reported mammogram was normal [FreeTextEntry1] : 58 year old female presents for annual examination. Pt. doing well no complaints. Pt. needs mammogram.  [Mammogramdate] : 2022 [ColonoscopyDate] : 2018 [PGHxTotal] : 5 [[facility] : [facility] [PGHxPremature] : 0 [PGHxAbortions] : 0 [[facility] CenterxLiving] : 4 [PGHxABInduced] : 0 [PGHxABSpont] : 1 [PGHxEctopic] : 0 [PGHxMultBirths] : 0

## 2024-02-01 LAB
CYTOLOGY CVX/VAG DOC THIN PREP: ABNORMAL
HPV HIGH+LOW RISK DNA PNL CVX: NOT DETECTED

## 2024-06-06 ENCOUNTER — NON-APPOINTMENT (OUTPATIENT)
Age: 59
End: 2024-06-06

## 2024-06-06 DIAGNOSIS — N64.89 OTHER SPECIFIED DISORDERS OF BREAST: ICD-10-CM

## 2024-06-08 ENCOUNTER — NON-APPOINTMENT (OUTPATIENT)
Age: 59
End: 2024-06-08

## 2024-06-10 DIAGNOSIS — N60.19 DIFFUSE CYSTIC MASTOPATHY OF UNSPECIFIED BREAST: ICD-10-CM

## 2025-01-20 ENCOUNTER — NON-APPOINTMENT (OUTPATIENT)
Age: 60
End: 2025-01-20

## 2025-06-10 ENCOUNTER — NON-APPOINTMENT (OUTPATIENT)
Age: 60
End: 2025-06-10

## (undated) DEVICE — VENODYNE/SCD SLEEVE CALF MEDIUM

## (undated) DEVICE — GLV 7 PROTEXIS (WHITE)

## (undated) DEVICE — NDL HYPO SAFE 22G X 1.5" (BLACK)

## (undated) DEVICE — DRSG MASTISOL

## (undated) DEVICE — AUTO GAS FILL CONSTELLATION

## (undated) DEVICE — FLEXLOOP CURVED SCRAPER MEMBRANE 25G

## (undated) DEVICE — LIGHT SHIELD CORNEAL

## (undated) DEVICE — CANNULA MEDONE FLEXTIP 25G

## (undated) DEVICE — LENS VITRECTOMY FLAT

## (undated) DEVICE — PACK VITRECTOMY

## (undated) DEVICE — SUT VICRYL 7-0 12" TG140-8 DA

## (undated) DEVICE — WARMING BLANKET LOWER ADULT

## (undated) DEVICE — CONSTELLATION TOTAL PLUS PAK 23G

## (undated) DEVICE — SOL IRR BAL SALT + 500ML

## (undated) DEVICE — DRAPE STERI-DRAPE INCISE 13X13"

## (undated) DEVICE — ILM FORCEP 25G